# Patient Record
Sex: FEMALE | Employment: OTHER | URBAN - METROPOLITAN AREA
[De-identification: names, ages, dates, MRNs, and addresses within clinical notes are randomized per-mention and may not be internally consistent; named-entity substitution may affect disease eponyms.]

---

## 2018-11-15 ENCOUNTER — TELEPHONE (OUTPATIENT)
Dept: INFECTIOUS DISEASES | Age: 68
End: 2018-11-15

## 2020-08-10 PROBLEM — Z91.199 MEDICAL NON-COMPLIANCE: Status: ACTIVE | Noted: 2020-08-10

## 2020-08-10 PROBLEM — E11.51: Status: ACTIVE | Noted: 2020-08-10

## 2020-08-10 PROBLEM — Z72.0 TOBACCO USER: Status: ACTIVE | Noted: 2020-08-10

## 2020-08-10 PROBLEM — S71.001A OPEN WOUND OF RIGHT HIP: Status: ACTIVE | Noted: 2020-08-10

## 2020-08-10 PROBLEM — S91.302A NON HEALING LEFT HEEL WOUND: Status: ACTIVE | Noted: 2020-08-10

## 2020-08-10 PROBLEM — G62.9 PERIPHERAL NEUROPATHY: Status: ACTIVE | Noted: 2020-08-10

## 2020-08-10 NOTE — PROGRESS NOTES
[facility] - Initial Office Consult Note  Today's Date and Time: 8/11/2020, 5:17 PM    Diagnostic Impression :     1. Non healing left heel wound    2. Open wound of right hip, initial encounter    3. Uncontrolled type 2 DM with peripheral circulatory disorder ([address]    4. Microangiopathy, diabetic ([address]    5. Polyneuropathy associated with underlying disease ([address]    6. Tobacco user    7. Medical non-compliance        Recommendations   · Complete current course of Doxycyline  · No weight bearing to Lt heel, keep leg elevated, ACE wraps for compression at HS  · Continue wound care per Dr Leona Bob  · Maintain blood sugar control, no tobacco use  · RTO prn    Chief complaint/reason for consultation:     Chief Complaint   Patient presents with    New Patient     Lt heel wound, Rt hip wound       History of Present Illness: Elvia Fisher is a 77y.o.-year-old  female who was initially evaluated on 8/11/2020. Patient seen at the request of Dr. Camops Dow:     Pt presents for evaluation of a non healing Lt heel pressure wound and Rt hip wound. Per the notes in May the wound on her left heel was clean and had been improving in size and depth. At that time she was placed on a wound VAC to help speed up the healing process. She presented back in June for a follow-up visit and the [facility] was not working. At that time it was noted to have tissue necrosis and increased drainage. The wound was debrided, there was no bone exposure noted and soft tissue cultures were sent. She was treated with a 10-day course of Augmentin and doxycycline. Patient was last seen at wound care on July 9. At that time the wound was noted to be stable, with 20% superficial slough. Again, there was no bone exposure noted.   The wound was noted to have granulation tissue as well as callus formation at the periphery of the wound with slight maceration and a moderate amount of compiance and necrotizing fasciitis. On exam her wounds are clean with pale wound beds  She is just completing a course of Doxycycline  Pt is strongly advised to get her blood sugar under control and to avoid putting any pressure on her heel at all and recommended to keep her Lt leg elevated as much as possible. ACE wraps at HS may help. She is congratulated on stopping smoking  She was educated regarding the need to clean and debride the wound bed so that the wound can heal.  She was educated regarding microvascular disease, osteomyelitis and its treatment. Pt reported understanding. PLAN:     · Complete current course of Doxycyline  · No weight bearing to Lt heel, keep leg elevated, ACE wraps for compression at HS  · Continue wound care per Dr Roni Kelsey  · Maintain blood sugar control, no tobacco use  · RTO prn      I have personally reviewed the past medical history, past surgical history, medications, social history, and family history, and I have updated the database accordingly.   Past Medical History:     Past Medical History:   Diagnosis Date    Diabetes ([facility]        Past Surgical  History:     Past Surgical History:   Procedure Laterality Date    GALLBLADDER SURGERY      HIP SURGERY Right     LEG AMPUTATION BELOW KNEE      SHOULDER SURGERY Right        Medications:     Current Outpatient Medications:     acetaminophen (TYLENOL) 500 MG tablet, Take 500 mg by mouth every 6 hours as needed for Pain, Disp: , Rfl:     acetic acid 0.25 % irrigation, Apply moist dressing to wound daily, Disp: , Rfl:     ammonium lactate (AMLACTIN) 12 % cream, Apply topically as needed, Disp: , Rfl:     amoxicillin-clavulanate (AUGMENTIN) 875-125 MG per tablet, Take 1 tablet by mouth 2 times daily, Disp: , Rfl:     amoxicillin-clavulanate (AUGMENTIN) 875-125 MG per tablet, , Disp: , Rfl:     aspirin 81 MG chewable tablet, Take 81 mg by mouth daily, Disp: , Rfl:     atorvastatin (LIPITOR) 40 MG tablet, Take 40 mg by mouth daily, Disp: , Rfl:     buPROPion (WELLBUTRIN SR) 200 MG extended release tablet, TAKE 1 TABLET BY MOUTH TWICE DAILY, Disp: , Rfl:     buPROPion (WELLBUTRIN SR) 150 MG extended release tablet, Take 150 mg by mouth 2 times daily, Disp: , Rfl:     cephALEXin (KEFLEX) 250 MG capsule, Take 500 mg by mouth 4 times daily, Disp: , Rfl:     chlorthalidone (HYGROTON) 25 MG tablet, Take 25 mg by mouth daily, Disp: , Rfl:     chlorthalidone (HYGROTON) 25 MG tablet, TAKE 1 TABLET BY MOUTH ONCE DAILY, Disp: , Rfl:     clopidogrel (PLAVIX) 75 MG tablet, , Disp: , Rfl:     doxycycline hyclate (VIBRAMYCIN) 100 MG capsule, TAKE 1 CAPSULE BY MOUTH TWICE DAILY FOR 14 DAYS WITH FOOD PLEASE HOLD MEDICATIONS THAT CONTAIN ALUMINUM BISMUTH IRON AND MAGNESIUM SALTS WHI, Disp: , Rfl:     doxycycline hyclate (VIBRAMYCIN) 100 MG capsule, TAKE 1 CAPSULE BY MOUTH TWICE DAILY FOR 14 DAYS WITH FOOD PLEASE HOLD MEDICATIONS THAT CONTAIN ALUMINUM BISMUTH IRON AND MAGNESIUM SALTS WHI, Disp: , Rfl:     furosemide (LASIX) 20 MG tablet, , Disp: , Rfl:     furosemide (LASIX) 20 MG tablet, , Disp: , Rfl:     gabapentin (NEURONTIN) 600 MG tablet, TAKE 2 TABLETS BY MOUTH THREE TIMES DAILY, Disp: , Rfl:     gabapentin (NEURONTIN) 300 MG capsule, Take 300 mg by mouth., Disp: , Rfl:     gentamicin (GARAMYCIN) 0.1 % ointment, Apply topically 2 times daily, Disp: , Rfl:     hydroCHLOROthiazide (HYDRODIURIL) 25 MG tablet, , Disp: , Rfl:     hydroCHLOROthiazide (HYDRODIURIL) 25 MG tablet, , Disp: , Rfl:     insulin 70-30 (NOVOLIN 70/30) (70-30) 100 UNIT per ML injection vial, INJECT 22 UNITS SUBCUTANEOUSLY TWICE DAILY, Disp: , Rfl:     NOVOLIN 70/30 (70-30) 100 UNIT/ML injection vial, INJECT 22 UNITS SUBCUTANEOUSLY TWICE DAILY, Disp: , Rfl:     insulin regular (NOVOLIN R) 100 UNIT/ML injection, INJECT UP TO 36 UNITS SUBCUTANEOUSLY THREE TIMES DAILY PER SLIDING SCALE, Disp: , Rfl:     NOVOLIN R 100 UNIT/ML injection, INJECT UP TO 36 UNITS SUBCUTANEOUSLY THREE TIMES DAILY PER SLIDING SCALE, Disp: , Rfl:     loratadine (CLARITIN) 10 MG tablet, Take 10 mg by mouth daily, Disp: , Rfl:     LORazepam (ATIVAN) 0.5 MG tablet, Take 0.5 mg by mouth nightly., Disp: , Rfl:     mirtazapine (REMERON) 15 MG tablet, Take 15 mg by mouth nightly, Disp: , Rfl:     mirtazapine (REMERON) 30 MG tablet, Take 30 mg by mouth daily, Disp: , Rfl:     mirtazapine (REMERON) 30 MG tablet, TAKE 1 TABLET BY MOUTH ONCE DAILY, Disp: , Rfl:     mupirocin (BACTROBAN) 2 % cream, Apply topically daily, Disp: , Rfl:     venlafaxine (EFFEXOR) 37.5 MG tablet, Take 37.5 mg by mouth 2 times daily, Disp: , Rfl:      Social History:     Social History     Socioeconomic History    Marital status:       Spouse name: Not on file    Number of children: Not on file    Years of education: Not on file    Highest education level: Not on file   Occupational History    Not on file   Social Needs    Financial resource strain: Not on file    Food insecurity     Worry: Not on file     Inability: Not on file    Transportation needs     Medical: Not on file     Non-medical: Not on file   Tobacco Use    Smoking status: Light Tobacco Smoker     Start date: 8/11/2002    Smokeless tobacco: Never Used    Tobacco comment: 5 cigarettes a day   Substance and Sexual Activity    Alcohol use: Not on file    Drug use: Not on file    Sexual activity: Not on file   Lifestyle    Physical activity     Days per week: Not on file     Minutes per session: Not on file    Stress: Not on file   Relationships    Social connections     Talks on phone: Not on file     Gets together: Not on file     Attends [employer]ical service: Not on file     Active member of club or organization: Not on file     Attends meetings of clubs or organizations: Not on file     Relationship status: Not on file    Intimate partner violence     Fear of current or ex partner: Not on file     Emotionally abused: Not on file Physically abused: Not on file     Forced sexual activity: Not on file   Other Topics Concern    Not on file   Social History Narrative    Not on file       Family History:   History reviewed. No pertinent family history. Allergies:   Patient has no known allergies. Review of Systems:   Constitutional: No fevers or chills. No systemic complaints  Head: No headaches  Eyes: No double vision or blurry vision. ENT: No sore throat or runny nose. . No hearing loss, tinnitus or vertigo. Cardiovascular: No chest pain or palpitations. No shortness of breath. No HILLS  Lung: No shortness of breath or cough. No sputum production  Abdomen: No nausea, vomiting, diarrhea, or abdominal pain. .  Genitourinary: No increased urinary frequency, or dysuria. No hematuria. No suprapubic or CVA pain  Musculoskeletal: No muscle aches or pains. No joint effusions, swelling or deformities  Hematologic: No bleeding or bruising. Neurologic: No headache, weakness, numbness, or tingling. Physical Examination :   BP (!) 117/56 (Site: Right Upper Arm, Position: Sitting, Cuff Size: Large Adult)   Pulse 82   Temp 98.2 °F (36.8 °C) (Temporal)   Resp 18   Ht 6' 2\" (1.88 m)   Wt 280 lb (127 kg)   SpO2 99% Comment: room air at rest  BMI 35.95 kg/m²    General Appearance: Awake, alert, and in no apparent distress  Head:  Normocephalic, no trauma  Eyes: Pupils equal, round, reactive, to light and accommodation; extraocular movements intact; sclera anicteric; conjunctivae pink. No embolic phenomena. ENT: Oropharynx clear, without erythema, exudate, or thrush. No tenderness of sinuses. Mouth/throat: mucosa pink and moist. No lesions. Dentition in good repair. Neck:Supple, without lymphadenopathy. Thyroid normal, No bruits. Pulmonary/Chest: Clear to auscultation, without wheezes, rales, or rhonchi. No dullness to percussion. Cardiovascular: Regular rate and rhythm without murmurs, rubs, or gallops.  Ectopic beats   Abdomen: Soft, non tender. Bowel sounds normal. No organomegaly  All four Extremities: Rt BKA, Lt thigh with large burn, large Lt calcaneal wound, clean. Neurologic: No gross sensory or motor deficits. Skin: Warm and dry. Significant signs of peripheral arterial and venous insufficiency. Rt hip with large open wound, clean. Medical Decision Making:   I have independently reviewed/ordered the following labs:    CBC with Differential:  No results found for: WBC, HGB, HCT, PLT, SEGSPCT, BANDSPCT, LYMPHOPCT, MONOPCT, EOSPCT  BMP:   No results found for: NA, K, CL, CO2, BUN, CREATININE, MG  Hepatic Function Panel:  No results found for: PROT, LABALBU, BILIDIR, IBILI, BILITOT, ALKPHOS, ALT, AST  No results found for: RPR  No results found for: HIV  No results found for: BC  No results found for: MUCUS, PH, RBC, TRICHOMONAS, WBC, YEAST, TURBIDITY  No results found for: CREATININE, GLUCOSE, KETONES    Thank you for allowing us to participate in the care of this patient. Please call with questions.     Neva Valerio MD  Pager: [phone] - Office: [phone]

## 2020-08-11 ENCOUNTER — OFFICE VISIT (OUTPATIENT)
Dept: INFECTIOUS DISEASES | Age: 70
End: 2020-08-11
Payer: MEDICARE

## 2020-08-11 VITALS
HEART RATE: 82 BPM | SYSTOLIC BLOOD PRESSURE: 117 MMHG | HEIGHT: 72 IN | DIASTOLIC BLOOD PRESSURE: 56 MMHG | OXYGEN SATURATION: 99 % | TEMPERATURE: 98.2 F | BODY MASS INDEX: 37.93 KG/M2 | WEIGHT: 280 LBS | RESPIRATION RATE: 18 BRPM

## 2020-08-11 PROCEDURE — 99204 OFFICE O/P NEW MOD 45 MIN: CPT | Performed by: INTERNAL MEDICINE

## 2020-08-11 RX ORDER — HYDROCHLOROTHIAZIDE 25 MG/1
TABLET ORAL
COMMUNITY
Start: 2020-06-23

## 2020-08-11 RX ORDER — CHLORTHALIDONE 25 MG/1
25 TABLET ORAL DAILY
COMMUNITY
Start: 2020-04-24

## 2020-08-11 RX ORDER — HUMAN INSULIN 100 [IU]/ML
INJECTION, SOLUTION SUBCUTANEOUS
COMMUNITY
Start: 2020-08-06

## 2020-08-11 RX ORDER — ASPIRIN 81 MG/1
81 TABLET, CHEWABLE ORAL DAILY
COMMUNITY

## 2020-08-11 RX ORDER — BUPROPION HYDROCHLORIDE 150 MG/1
150 TABLET, EXTENDED RELEASE ORAL 2 TIMES DAILY
COMMUNITY

## 2020-08-11 RX ORDER — MIRTAZAPINE 30 MG/1
30 TABLET, FILM COATED ORAL DAILY
COMMUNITY
Start: 2020-07-21

## 2020-08-11 RX ORDER — AMOXICILLIN AND CLAVULANATE POTASSIUM 875; 125 MG/1; MG/1
TABLET, FILM COATED ORAL
COMMUNITY
Start: 2020-06-14

## 2020-08-11 RX ORDER — CHLORTHALIDONE 25 MG/1
TABLET ORAL
COMMUNITY
Start: 2020-07-27

## 2020-08-11 RX ORDER — CLOPIDOGREL BISULFATE 75 MG/1
TABLET ORAL
COMMUNITY
Start: 2020-07-13

## 2020-08-11 RX ORDER — BUPROPION HYDROCHLORIDE 200 MG/1
TABLET, EXTENDED RELEASE ORAL
COMMUNITY
Start: 2020-07-21

## 2020-08-11 RX ORDER — GENTAMICIN SULFATE 1 MG/G
OINTMENT TOPICAL 2 TIMES DAILY
COMMUNITY
Start: 2020-01-20

## 2020-08-11 RX ORDER — CEPHALEXIN 250 MG/1
500 CAPSULE ORAL 4 TIMES DAILY
COMMUNITY

## 2020-08-11 RX ORDER — MUPIROCIN CALCIUM 20 MG/G
CREAM TOPICAL DAILY
COMMUNITY

## 2020-08-11 RX ORDER — ACETIC ACID 0.25 G/100ML
IRRIGANT IRRIGATION
COMMUNITY
Start: 2020-01-20

## 2020-08-11 RX ORDER — FUROSEMIDE 20 MG/1
TABLET ORAL
COMMUNITY
Start: 2020-07-07

## 2020-08-11 RX ORDER — LORATADINE 10 MG/1
10 TABLET ORAL DAILY
COMMUNITY

## 2020-08-11 RX ORDER — DOXYCYCLINE HYCLATE 100 MG/1
CAPSULE ORAL
COMMUNITY
Start: 2020-07-09

## 2020-08-11 RX ORDER — MIRTAZAPINE 15 MG/1
15 TABLET, FILM COATED ORAL NIGHTLY
COMMUNITY

## 2020-08-11 RX ORDER — GABAPENTIN 300 MG/1
300 CAPSULE ORAL
COMMUNITY

## 2020-08-11 RX ORDER — ACETAMINOPHEN 500 MG
500 TABLET ORAL EVERY 6 HOURS PRN
COMMUNITY

## 2020-08-11 RX ORDER — ATORVASTATIN CALCIUM 40 MG/1
40 TABLET, FILM COATED ORAL DAILY
COMMUNITY
Start: 2018-10-08

## 2020-08-11 RX ORDER — LORAZEPAM 0.5 MG/1
0.5 TABLET ORAL NIGHTLY
COMMUNITY

## 2020-08-11 RX ORDER — DOXYCYCLINE HYCLATE 100 MG/1
CAPSULE ORAL
COMMUNITY
Start: 2020-07-22

## 2020-08-11 RX ORDER — HUMAN INSULIN 100 [USP'U]/ML
INJECTION, SUSPENSION SUBCUTANEOUS
COMMUNITY
Start: 2020-08-03

## 2020-08-11 RX ORDER — VENLAFAXINE 37.5 MG/1
37.5 TABLET ORAL 2 TIMES DAILY
COMMUNITY

## 2020-08-11 RX ORDER — ACETAMINOPHEN 500 MG
1000 TABLET ORAL 2 TIMES DAILY
COMMUNITY
End: 2020-08-11 | Stop reason: SDUPTHER

## 2020-08-11 RX ORDER — GABAPENTIN 600 MG/1
TABLET ORAL
COMMUNITY
Start: 2020-07-21

## 2020-08-11 RX ORDER — AMOXICILLIN AND CLAVULANATE POTASSIUM 875; 125 MG/1; MG/1
1 TABLET, FILM COATED ORAL 2 TIMES DAILY
COMMUNITY
Start: 2020-06-14

## 2020-08-11 RX ORDER — AMMONIUM LACTATE 12 G/100G
CREAM TOPICAL PRN
COMMUNITY
Start: 2020-01-14

## 2020-08-11 RX ORDER — MIRTAZAPINE 30 MG/1
TABLET, FILM COATED ORAL
COMMUNITY
Start: 2020-07-21